# Patient Record
Sex: FEMALE | Race: WHITE | NOT HISPANIC OR LATINO | Employment: STUDENT | URBAN - METROPOLITAN AREA
[De-identification: names, ages, dates, MRNs, and addresses within clinical notes are randomized per-mention and may not be internally consistent; named-entity substitution may affect disease eponyms.]

---
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
## 2025-01-14 ENCOUNTER — OFFICE VISIT (OUTPATIENT)
Dept: URGENT CARE | Facility: CLINIC | Age: 20
End: 2025-01-14
Payer: COMMERCIAL

## 2025-01-14 VITALS
HEART RATE: 85 BPM | SYSTOLIC BLOOD PRESSURE: 122 MMHG | RESPIRATION RATE: 18 BRPM | DIASTOLIC BLOOD PRESSURE: 78 MMHG | TEMPERATURE: 98.4 F | BODY MASS INDEX: 27.57 KG/M2 | WEIGHT: 165.5 LBS | HEIGHT: 65 IN | OXYGEN SATURATION: 96 %

## 2025-01-14 DIAGNOSIS — J02.0 ACUTE STREPTOCOCCAL PHARYNGITIS: Primary | ICD-10-CM

## 2025-01-14 LAB — S PYO AG THROAT QL: NEGATIVE

## 2025-01-14 PROCEDURE — 87880 STREP A ASSAY W/OPTIC: CPT | Performed by: PHYSICIAN ASSISTANT

## 2025-01-14 PROCEDURE — 99213 OFFICE O/P EST LOW 20 MIN: CPT | Performed by: PHYSICIAN ASSISTANT

## 2025-01-14 RX ORDER — AMOXICILLIN 500 MG/1
500 CAPSULE ORAL EVERY 12 HOURS SCHEDULED
Qty: 20 CAPSULE | Refills: 0 | Status: SHIPPED | OUTPATIENT
Start: 2025-01-14 | End: 2025-01-24

## 2025-01-14 NOTE — PROGRESS NOTES
[facility]        NAME: Josiane Santiago is a 19 y.o. female  : 2005    MRN: [id number]  DATE: 2025  TIME: 2:48 PM    Assessment and Plan   Acute streptococcal pharyngitis [J02.0]  1. Acute streptococcal pharyngitis  amoxicillin (AMOXIL) 500 mg capsule            Patient Instructions   Strep throat  Rapid strep negative, 3 out of 4 Centor criteria present we will treat as strep  Rx amoxicillin twice daily x 10 days, sent via EMR  Rapid strep positive  Recommend warm salt water gargles  tylenol/ibuprofen as needed for pain/fever  Rest, fluids and supportive care     Follow up with PCP in 3-5 days.  Proceed to  ER if symptoms worsen.    If tests have been performed at [facility], our office will contact you with results if changes need to be made to the care plan discussed with you at the visit.  You can review your full results on [facility] MyChart.    Chief Complaint     Chief Complaint   Patient presents with    Sore Throat     Throat sore, nasal congestion and ears are popping         History of Present Illness       Viola is a 19-year-old female who presents to clinic complaining of sore throat x 1 week.  She states since starting a week ago its progressively worsened.  She also notes swollen painful lymph nodes in her neck and white spots on her tonsils.  She also is complaining of nasal congestion, rhinorrhea, and bilateral ear clogged sensation.  She denies any fever, chills, fatigue, myalgias, cough, shortness of breath, nausea, vomiting, diarrhea, loss of taste or smell, recent travel, or any known sick contacts.        Review of Systems   Review of Systems   Constitutional:  Negative for chills, fatigue and fever.   HENT:  Positive for congestion, hearing loss, rhinorrhea and sore throat. Negative for ear pain.    Respiratory:  Negative for cough and shortness of breath.    Gastrointestinal:  Negative for diarrhea, nausea and vomiting.   Musculoskeletal:  Negative for myalgias.  "  Neurological:  Negative for headaches.         Current Medications       Current Outpatient Medications:     amoxicillin (AMOXIL) 500 mg capsule, Take 1 capsule (500 mg total) by mouth every 12 (twelve) hours for 10 days, Disp: 20 capsule, Rfl: 0    Tri-Lo-Estarylla 0.18/0.215/0.25 MG-25 MCG TABS, TAKE ONE TABLET BY MOUTH EVERY DAY, Disp: 28 tablet, Rfl: 5    fluticasone (FLONASE) 50 mcg/act nasal spray, 1 spray into each nostril 2 (two) times a day, Disp: 16 g, Rfl: 0    ketoconazole (NIZORAL) 2 % cream, Apply topically daily (Patient not taking: Reported on 11/15/2023), Disp: 15 g, Rfl: 0    Miconazole 3 Combo-Supp 200 & 2 MG-% (9GM), , Disp: , Rfl:     Miconazole Nitrate-Wipes (Monistat 3 Combination Pack) 200-2 MG-% KIT, Insert 1 kit into the vagina in the morning (Patient not taking: Reported on 11/15/2023), Disp: 1 kit, Rfl: 0    Current Allergies     Allergies as of 01/14/2025    (No Known Allergies)            The following portions of the patient's history were reviewed and updated as appropriate: allergies, current medications, past family history, past medical history, past social history, past surgical history and problem list.     History reviewed. No pertinent past medical history.    Past Surgical History:   Procedure Laterality Date    UMBILICAL HERNIA REPAIR         Family History   Problem Relation Age of Onset    No Known Problems Mother     Diabetes Father     No Known Problems Sister     No Known Problems Brother          Medications have been verified.        Objective   /78 (BP Location: Right arm, Patient Position: Sitting, Cuff Size: Standard)   Pulse 85   Temp 98.4 °F (36.9 °C) (Temporal)   Resp 18   Ht 5' 5\" (1.651 m)   Wt 75.1 kg (165 lb 8 oz)   LMP 12/24/2024 (Approximate)   SpO2 96%   BMI 27.54 kg/m²   Patient's last menstrual period was 12/24/2024 (approximate).       Physical Exam     Physical Exam  Vitals and nursing note reviewed.   Constitutional:       General: She " is not in acute distress.     Appearance: She is well-developed. She is not ill-appearing.   HENT:      Right Ear: Tympanic membrane and ear canal normal.      Left Ear: Tympanic membrane and ear canal normal.      Nose: Congestion present. No rhinorrhea.      Mouth/Throat:      Mouth: Mucous membranes are moist.      Pharynx: Posterior oropharyngeal erythema present. No pharyngeal swelling, oropharyngeal exudate or uvula swelling.      Tonsils: Tonsillar exudate present. 1+ on the right. 1+ on the left.   Cardiovascular:      Rate and Rhythm: Normal rate and regular rhythm.      Heart sounds: Normal heart sounds.   Pulmonary:      Effort: Pulmonary effort is normal.      Breath sounds: Normal breath sounds.   Lymphadenopathy:      Cervical: Cervical adenopathy present.   Neurological:      Mental Status: She is alert and oriented to person, place, and time.   Psychiatric:         Mood and Affect: Mood normal.         Behavior: Behavior normal.

## 2025-01-17 LAB — B-HEM STREP SPEC QL CULT: NEGATIVE

## 2025-03-05 ENCOUNTER — OFFICE VISIT (OUTPATIENT)
Age: 20
End: 2025-03-05
Payer: COMMERCIAL

## 2025-03-05 VITALS — HEIGHT: 65 IN | BODY MASS INDEX: 27.49 KG/M2 | WEIGHT: 165 LBS

## 2025-03-05 DIAGNOSIS — L60.0 INGROWN NAIL OF GREAT TOE OF RIGHT FOOT: Primary | ICD-10-CM

## 2025-03-05 PROCEDURE — 11750 EXCISION NAIL&NAIL MATRIX: CPT | Performed by: STUDENT IN AN ORGANIZED HEALTH CARE EDUCATION/TRAINING PROGRAM

## 2025-03-05 PROCEDURE — 99213 OFFICE O/P EST LOW 20 MIN: CPT | Performed by: STUDENT IN AN ORGANIZED HEALTH CARE EDUCATION/TRAINING PROGRAM

## 2025-03-05 NOTE — PROGRESS NOTES
"[facility] Podiatric Medicine and Surgery Office Visit    ASSESSMENT     Diagnoses and all orders for this visit:    Ingrown nail of great toe of right foot  -     Nail removal           Problem List Items Addressed This Visit    None  Visit Diagnoses         Ingrown nail of great toe of right foot    -  Primary    Relevant Orders    Nail removal            PLAN  -Nail avulsion performed as below:  -Nail bed was dressed with bacitracin, DSD, 1 inch Coban  -Patient instructed to take bandage off in 24 hours, wash area lightly with warm soap and water, dry area thoroughly, apply bacitracin and Band-Aid daily x10 days.  -Patient instructed to call our office for an earlier appointment should any extreme pain, redness, swelling, purulent drainage present.    Nail removal    Date/Time: 3/5/2025 11:00 AM    Performed by: Fercho Mcduffie DPM  Authorized by: Fercho Mcduffie DPM    Patient location:  Clinic  Indications / Diagnosis:  Ingrown toenail  Universal Protocol:  procedure performed by consultantConsent: Verbal consent obtained.  Risks and benefits: risks, benefits and alternatives were discussed  Consent given by: patient  Time out: Immediately prior to procedure a \"time out\" was called to verify the correct patient, procedure, equipment, support staff and site/side marked as required.  Patient understanding: patient states understanding of the procedure being performed  Site marked: the operative site was marked  Patient identity confirmed: verbally with patient    Location:     Foot:  R big toe  Pre-procedure details:     Skin preparation:  Alcohol and Betadine  Anesthesia (see MAR for exact dosages):     Anesthesia method:  Local infiltration    Local anesthetic:  Lidocaine 1% w/o epi  Nail Removal:     Nail removed:  Partial    Nail side:  Lateral    Nail bed sutured: no    Ingrown nail:     Wedge excision of skin: no      Nail matrix removed or ablated:  Partial  Post-procedure details:     Dressing:  4x4 sterile " "gauze, antibiotic ointment and gauze roll    Patient tolerance of procedure:  Tolerated well, no immediate complications       SUBJECTIVE    Chief Complaint:  Right big toenail pain     Patient ID: Josiane Santiago     8/19/2024: Josiane presents today for evaluation of her right big toe pain.  She states that this been going on for the past week or 2.  She states that it is on the opposite side that was treated previously.    3/5/2025: Viola presents today for another ingrown toenail. She states that her right great toe started bothering her again over the weekend. She does mention that there has been blood and puss coming from the area.         The following portions of the patient's history were reviewed and updated as appropriate: allergies, current medications, past family history, past medical history, past social history, past surgical history and problem list.    Review of Systems   Constitutional: Negative.    HENT: Negative.     Respiratory: Negative.     Cardiovascular: Negative.    Gastrointestinal: Negative.    Musculoskeletal: Negative.    Skin: Negative.    Neurological: Negative.          OBJECTIVE      Ht 5' 5\" (1.651 m)   Wt 74.8 kg (165 lb)   BMI 27.46 kg/m²        Physical Exam  Constitutional:       Appearance: Normal appearance.   HENT:      Head: Normocephalic and atraumatic.   Eyes:      General:         Right eye: No discharge.         Left eye: No discharge.   Cardiovascular:      Rate and Rhythm: Normal rate and regular rhythm.      Pulses:           Dorsalis pedis pulses are 2+ on the right side and 2+ on the left side.        Posterior tibial pulses are 2+ on the right side and 2+ on the left side.   Pulmonary:      Effort: Pulmonary effort is normal.      Breath sounds: Normal breath sounds.   Musculoskeletal:      Comments: Pain on palpation noted to the right hallux, lateral toenail border.   Skin:     General: Skin is warm.      Capillary Refill: Capillary refill takes less than " 2 seconds.      Comments: Right hallux toenail at the lateral nail fold is growing underneath the periungual skin consistent with onychocryptosis.   Neurological:      Sensory: Sensation is intact. No sensory deficit.

## 2025-05-01 ENCOUNTER — OFFICE VISIT (OUTPATIENT)
Age: 20
End: 2025-05-01
Payer: COMMERCIAL

## 2025-05-01 VITALS — WEIGHT: 165 LBS | BODY MASS INDEX: 27.49 KG/M2 | HEIGHT: 65 IN

## 2025-05-01 DIAGNOSIS — L03.031 PARONYCHIA, TOE, RIGHT: ICD-10-CM

## 2025-05-01 DIAGNOSIS — L60.0 INGROWN RIGHT BIG TOENAIL: Primary | ICD-10-CM

## 2025-05-01 PROCEDURE — 99214 OFFICE O/P EST MOD 30 MIN: CPT | Performed by: STUDENT IN AN ORGANIZED HEALTH CARE EDUCATION/TRAINING PROGRAM

## 2025-05-01 PROCEDURE — 11730 AVULSION NAIL PLATE SIMPLE 1: CPT | Performed by: STUDENT IN AN ORGANIZED HEALTH CARE EDUCATION/TRAINING PROGRAM

## 2025-05-01 RX ORDER — CEPHALEXIN 500 MG/1
500 CAPSULE ORAL EVERY 6 HOURS SCHEDULED
Qty: 28 CAPSULE | Refills: 0 | Status: SHIPPED | OUTPATIENT
Start: 2025-05-01 | End: 2025-05-08

## 2025-05-01 NOTE — PROGRESS NOTES
"[facility] Podiatric Medicine and Surgery Office Visit    ASSESSMENT     Diagnoses and all orders for this visit:    Ingrown right big toenail  -     cephalexin (KEFLEX) 500 mg capsule; Take 1 capsule (500 mg total) by mouth every 6 (six) hours for 7 days  -     Nail removal    Paronychia, toe, right  -     cephalexin (KEFLEX) 500 mg capsule; Take 1 capsule (500 mg total) by mouth every 6 (six) hours for 7 days         Problem List Items Addressed This Visit    None  Visit Diagnoses         Ingrown right big toenail    -  Primary    Relevant Medications    cephalexin (KEFLEX) 500 mg capsule    Other Relevant Orders    Nail removal      Paronychia, toe, right        Relevant Medications    cephalexin (KEFLEX) 500 mg capsule          PLAN  -Nail avulsion performed as below:  -Nail bed was dressed with bacitracin, DSD, 1 inch Coban  -Patient instructed to take bandage off in 24 hours, wash area lightly with warm soap and water, dry area thoroughly, apply bacitracin and Band-Aid daily x10 days.  -Rx Keflex  -Patient instructed to call our office for an earlier appointment should any extreme pain, redness, swelling, purulent drainage present.    Nail removal    Date/Time: 5/1/2025 9:30 AM    Performed by: Fercho Mcduffie DPM  Authorized by: Fercho Mcduffie DPM    Patient location:  Clinic  Indications / Diagnosis:  Ingrown toenail  Universal Protocol:  procedure performed by consultantConsent: Verbal consent obtained.  Risks and benefits: risks, benefits and alternatives were discussed  Consent given by: patient  Time out: Immediately prior to procedure a \"time out\" was called to verify the correct patient, procedure, equipment, support staff and site/side marked as required.  Patient understanding: patient states understanding of the procedure being performed  Site marked: the operative site was marked  Patient identity confirmed: verbally with patient    Location:     Foot:  R big toe  Pre-procedure details:     Skin " "preparation:  Alcohol and Betadine  Anesthesia (see MAR for exact dosages):     Anesthesia method:  Local infiltration    Local anesthetic:  Lidocaine 1% w/o epi  Nail Removal:     Nail removed:  Partial    Nail side:  Medial    Nail bed sutured: no    Ingrown nail:     Wedge excision of skin: no      Nail matrix removed or ablated:  None  Post-procedure details:     Dressing:  4x4 sterile gauze, antibiotic ointment and gauze roll    Patient tolerance of procedure:  Tolerated well, no immediate complications         SUBJECTIVE    Chief Complaint:  Right big toe ingrown     Patient ID: Josiane Rod presents today for reevaluation of her right great toenail. She states that around [facility] a family member stepped on her toe causing extreme pain. It has been very red, bloody and painful.       The following portions of the patient's history were reviewed and updated as appropriate: allergies, current medications, past family history, past medical history, past social history, past surgical history and problem list.    Review of Systems   Constitutional: Negative.    HENT: Negative.     Respiratory: Negative.     Cardiovascular: Negative.    Gastrointestinal: Negative.    Musculoskeletal: Negative.    Skin:  Positive for color change.   Neurological: Negative.          OBJECTIVE      Ht 5' 5\" (1.651 m)   Wt 74.8 kg (165 lb)   BMI 27.46 kg/m²        Physical Exam  Constitutional:       Appearance: Normal appearance.   HENT:      Head: Normocephalic and atraumatic.   Eyes:      General:         Right eye: No discharge.         Left eye: No discharge.   Cardiovascular:      Rate and Rhythm: Normal rate and regular rhythm.      Pulses:           Dorsalis pedis pulses are 2+ on the right side and 2+ on the left side.        Posterior tibial pulses are 2+ on the right side and 2+ on the left side.   Pulmonary:      Effort: Pulmonary effort is normal.      Breath sounds: Normal breath sounds.   Musculoskeletal: "      Comments: Pain on palpation noted to the right hallux, medial toenail border.   Skin:     General: Skin is warm.      Capillary Refill: Capillary refill takes less than 2 seconds.      Comments: Right hallux toenail at the medial nail fold is growing underneath the periungual skin consistent with onychocryptosis.   Neurological:      Sensory: Sensation is intact. No sensory deficit.

## 2025-05-27 DIAGNOSIS — Z30.09 BIRTH CONTROL COUNSELING: ICD-10-CM

## 2025-05-28 RX ORDER — NORGESTIMATE AND ETHINYL ESTRADIOL 7DAYSX3 LO
1 KIT ORAL DAILY
Qty: 28 TABLET | Refills: 2 | Status: SHIPPED | OUTPATIENT
Start: 2025-05-28

## 2025-06-02 ENCOUNTER — OFFICE VISIT (OUTPATIENT)
Age: 20
End: 2025-06-02
Payer: COMMERCIAL

## 2025-06-02 VITALS — WEIGHT: 165 LBS | HEIGHT: 65 IN | BODY MASS INDEX: 27.49 KG/M2

## 2025-06-02 DIAGNOSIS — L03.031 PARONYCHIA, TOE, RIGHT: ICD-10-CM

## 2025-06-02 DIAGNOSIS — L60.0 INGROWN RIGHT BIG TOENAIL: Primary | ICD-10-CM

## 2025-06-02 PROCEDURE — 99212 OFFICE O/P EST SF 10 MIN: CPT | Performed by: STUDENT IN AN ORGANIZED HEALTH CARE EDUCATION/TRAINING PROGRAM

## 2025-06-02 NOTE — PROGRESS NOTES
"[facility] Podiatric Medicine and Surgery Office Visit    ASSESSMENT     Diagnoses and all orders for this visit:    Ingrown right big toenail    Paronychia, toe, right           Problem List Items Addressed This Visit    None  Visit Diagnoses         Ingrown right big toenail    -  Primary      Paronychia, toe, right                PLAN  Viola and I discussed her right big toe.  All previous signs of paronychia/infection have resolved.  The nail is still slightly discolored, however it will continue to grow in on the medial aspect.  I encouraged her to call my office with any new or worsening podiatric concerns.    SUBJECTIVE    Chief Complaint:  Right big toe ingrown     Patient ID: Josiane Rod presents today for reevaluation of her right great toenail. She states that around Easter a family member stepped on her toe causing extreme pain. It has been very red, bloody and painful.     6/2/2025: Viola is overall doing well today. She states that her right great toe has been feeling better. She denies any systemic signs of infection.       The following portions of the patient's history were reviewed and updated as appropriate: allergies, current medications, past family history, past medical history, past social history, past surgical history and problem list.    Review of Systems   Constitutional: Negative.    HENT: Negative.     Respiratory: Negative.     Cardiovascular: Negative.    Gastrointestinal: Negative.    Musculoskeletal: Negative.    Skin:  Negative for color change.   Neurological: Negative.          OBJECTIVE      Ht 5' 5\" (1.651 m)   Wt 74.8 kg (165 lb)   BMI 27.46 kg/m²        Physical Exam  Constitutional:       Appearance: Normal appearance.   HENT:      Head: Normocephalic and atraumatic.     Eyes:      General:         Right eye: No discharge.         Left eye: No discharge.       Cardiovascular:      Rate and Rhythm: Normal rate and regular rhythm.      Pulses:           " Dorsalis pedis pulses are 2+ on the right side and 2+ on the left side.        Posterior tibial pulses are 2+ on the right side and 2+ on the left side.   Pulmonary:      Effort: Pulmonary effort is normal.      Breath sounds: Normal breath sounds.     Musculoskeletal:      Comments: No pain on palpation today     Skin:     General: Skin is warm.      Capillary Refill: Capillary refill takes less than 2 seconds.      Comments: No signs of ingrown nail noted today.     Neurological:      Sensory: Sensation is intact. No sensory deficit.